# Patient Record
Sex: FEMALE | Race: WHITE | NOT HISPANIC OR LATINO | Employment: FULL TIME | URBAN - METROPOLITAN AREA
[De-identification: names, ages, dates, MRNs, and addresses within clinical notes are randomized per-mention and may not be internally consistent; named-entity substitution may affect disease eponyms.]

---

## 2018-06-20 DIAGNOSIS — Z01.812 PRE-PROCEDURAL LABORATORY EXAMINATION: ICD-10-CM

## 2018-06-20 LAB — HCG SERPL QL: NEGATIVE

## 2018-06-20 PROCEDURE — 84703 CHORIONIC GONADOTROPIN ASSAY: CPT

## 2018-06-20 PROCEDURE — 36415 COLL VENOUS BLD VENIPUNCTURE: CPT

## 2018-06-21 ENCOUNTER — HOSPITAL ENCOUNTER (OUTPATIENT)
Facility: MEDICAL CENTER | Age: 38
End: 2018-06-21
Attending: OPHTHALMOLOGY | Admitting: OPHTHALMOLOGY
Payer: COMMERCIAL

## 2018-06-21 VITALS
TEMPERATURE: 98.6 F | DIASTOLIC BLOOD PRESSURE: 67 MMHG | SYSTOLIC BLOOD PRESSURE: 109 MMHG | WEIGHT: 126.32 LBS | BODY MASS INDEX: 23.25 KG/M2 | RESPIRATION RATE: 16 BRPM | HEART RATE: 57 BPM | OXYGEN SATURATION: 98 % | HEIGHT: 62 IN

## 2018-06-21 PROCEDURE — 501425 HCHG SPONGE, WECKCEL SPEAR: Performed by: OPHTHALMOLOGY

## 2018-06-21 PROCEDURE — 160002 HCHG RECOVERY MINUTES (STAT): Performed by: OPHTHALMOLOGY

## 2018-06-21 PROCEDURE — 160035 HCHG PACU - 1ST 60 MINS PHASE I: Performed by: OPHTHALMOLOGY

## 2018-06-21 PROCEDURE — 160029 HCHG SURGERY MINUTES - 1ST 30 MINS LEVEL 4: Performed by: OPHTHALMOLOGY

## 2018-06-21 PROCEDURE — 502585 HCHG PACK, MUSCLE: Performed by: OPHTHALMOLOGY

## 2018-06-21 PROCEDURE — 160036 HCHG PACU - EA ADDL 30 MINS PHASE I: Performed by: OPHTHALMOLOGY

## 2018-06-21 PROCEDURE — 501836 HCHG SUTURE EYE: Performed by: OPHTHALMOLOGY

## 2018-06-21 PROCEDURE — 160009 HCHG ANES TIME/MIN: Performed by: OPHTHALMOLOGY

## 2018-06-21 PROCEDURE — 160041 HCHG SURGERY MINUTES - EA ADDL 1 MIN LEVEL 4: Performed by: OPHTHALMOLOGY

## 2018-06-21 PROCEDURE — 500447 HCHG DRESSING, TEGADERM 8X12: Performed by: OPHTHALMOLOGY

## 2018-06-21 PROCEDURE — 700101 HCHG RX REV CODE 250

## 2018-06-21 PROCEDURE — 160048 HCHG OR STATISTICAL LEVEL 1-5: Performed by: OPHTHALMOLOGY

## 2018-06-21 PROCEDURE — 700111 HCHG RX REV CODE 636 W/ 250 OVERRIDE (IP)

## 2018-06-21 PROCEDURE — 500291 HCHG CAUTERY, OPTHTHALMIC: Performed by: OPHTHALMOLOGY

## 2018-06-21 RX ORDER — PHENYLEPHRINE HYDROCHLORIDE 25 MG/ML
SOLUTION/ DROPS OPHTHALMIC
Status: COMPLETED
Start: 2018-06-21 | End: 2018-06-21

## 2018-06-21 RX ORDER — FLUTICASONE PROPIONATE 50 MCG
1 SPRAY, SUSPENSION (ML) NASAL DAILY
COMMUNITY

## 2018-06-21 RX ORDER — SODIUM CHLORIDE, SODIUM LACTATE, POTASSIUM CHLORIDE, CALCIUM CHLORIDE 600; 310; 30; 20 MG/100ML; MG/100ML; MG/100ML; MG/100ML
INJECTION, SOLUTION INTRAVENOUS CONTINUOUS
Status: DISCONTINUED | OUTPATIENT
Start: 2018-06-21 | End: 2018-06-21 | Stop reason: HOSPADM

## 2018-06-21 RX ORDER — BALANCED SALT SOLUTION 6.4; .75; .48; .3; 3.9; 1.7 MG/ML; MG/ML; MG/ML; MG/ML; MG/ML; MG/ML
SOLUTION OPHTHALMIC
Status: DISCONTINUED | OUTPATIENT
Start: 2018-06-21 | End: 2018-06-21 | Stop reason: HOSPADM

## 2018-06-21 RX ADMIN — PHENYLEPHRINE HYDROCHLORIDE 1 DROP: 2.5 SOLUTION/ DROPS OPHTHALMIC at 11:45

## 2018-06-21 RX ADMIN — SODIUM CHLORIDE, SODIUM LACTATE, POTASSIUM CHLORIDE, CALCIUM CHLORIDE: 600; 310; 30; 20 INJECTION, SOLUTION INTRAVENOUS at 11:45

## 2018-06-21 ASSESSMENT — PAIN SCALES - GENERAL
PAINLEVEL_OUTOF10: 5
PAINLEVEL_OUTOF10: 5
PAINLEVEL_OUTOF10: 0
PAINLEVEL_OUTOF10: 0

## 2018-06-21 NOTE — DISCHARGE INSTRUCTIONS
ACTIVITY: Rest and take it easy for the first 24 hours.  A responsible adult is recommended to remain with you during that time.  It is normal to feel sleepy.  We encourage you to not do anything that requires balance, judgment or coordination.    MILD FLU-LIKE SYMPTOMS ARE NORMAL. YOU MAY EXPERIENCE GENERALIZED MUSCLE ACHES, THROAT IRRITATION, HEADACHE AND/OR SOME NAUSEA.    FOR 24 HOURS DO NOT:  Drive, operate machinery or run household appliances.  Drink beer or alcoholic beverages.   Make important decisions or sign legal documents.    SPECIAL INSTRUCTIONS:     DIET: To avoid nausea, slowly advance diet as tolerated, avoiding spicy or greasy foods for the first day.  Add more substantial food to your diet according to your physician's instructions.  Babies can be fed formula or breast milk as soon as they are hungry.  INCREASE FLUIDS AND FIBER TO AVOID CONSTIPATION.    SURGICAL DRESSING/BATHING: OK to shower.     FOLLOW-UP APPOINTMENT:  A follow-up appointment should be arranged with your doctor; call to schedule.    You should CALL YOUR PHYSICIAN if you develop:  Fever greater than 101 degrees F.  Pain not relieved by medication, or persistent nausea or vomiting.  Excessive bleeding (blood soaking through dressing) or unexpected drainage from the wound.  Extreme redness or swelling around the incision site, drainage of pus or foul smelling drainage.  Inability to urinate or empty your bladder within 8 hours.  Problems with breathing or chest pain.    You should call 911 if you develop problems with breathing or chest pain.  If you are unable to contact your doctor or surgical center, you should go to the nearest emergency room or urgent care center.  Physician's telephone #: Dr. Thibodeaux [phone].    If any questions arise, call your doctor.  If your doctor is not available, please feel free to call the Surgical Center at [phone].  The Center is open Monday through Friday from 7AM to 7PM.  You can also  call the HEALTH HOTLINE open 24 hours/day, 7 days/week and speak to a nurse at [phone], or toll free at [phone].    A registered nurse may call you a few days after your surgery to see how you are doing after your procedure.    MEDICATIONS: Resume taking daily medication.  Take prescribed pain medication with food.  If no medication is prescribed, you may take non-aspirin pain medication if needed.  PAIN MEDICATION CAN BE VERY CONSTIPATING.  Take a stool softener or laxative such as senokot, pericolace, or milk of magnesia if needed.    Prescription given for Tobradex ointment given to patient, apply twice a day to affected eye. Tylenol #3 as needed for pain.  Last pain medication given at n/a.    If your physician has prescribed pain medication that includes Acetaminophen (Tylenol), do not take additional Acetaminophen (Tylenol) while taking the prescribed medication.    Depression / Suicide Risk    As you are discharged from this [facility] facility, it is important to learn how to keep safe from harming yourself.    Recognize the warning signs:  · Abrupt changes in personality, positive or negative- including increase in energy   · Giving away possessions  · Change in eating patterns- significant weight changes-  positive or negative  · Change in sleeping patterns- unable to sleep or sleeping all the time   · Unwillingness or inability to communicate  · Depression  · Unusual sadness, discouragement and loneliness  · Talk of wanting to die  · Neglect of personal appearance   · Rebelliousness- reckless behavior  · Withdrawal from people/activities they love  · Confusion- inability to concentrate     If you or a loved one observes any of these behaviors or has concerns about self-harm, here's what you can do:  · Talk about it- your feelings and reasons for harming yourself  · Remove any means that you might use to hurt yourself (examples: pills, rope, extension cords, firearm)  · Get professional  help from the community (Mental Health, Substance Abuse, psychological counseling)  · Do not be alone:Call your Safe Contact- someone whom you trust who will be there for you.  · Call your local CRISIS HOTLINE 426-7543 or 682-889-2176  · Call your local Children's Mobile Crisis Response Team [place] (684) 581-1941 or www.[employer]  · Call the toll free National Suicide Prevention Hotlines   · National Suicide Prevention Lifeline 552-747-CSZX (8560)  · National Hope Line Network 800-SUICIDE (894-0255)

## 2018-06-21 NOTE — PROGRESS NOTES
1509- Pt received from OR. Report from OR RN and Dr. Ovalle. VSS. Pt awake, eyes closed with ointment on right eye. Pt requesting water.  Lungs CTA.     1532-Pt resting in gurney, cold pack to right eye.     1620-Pt resting in gurney, c/o 5/10 right eye pain, declining pain medication.     1642-Pt waiting for ride.    1709-Pt discharged home with . Pt ambulatory with steady gait.

## 2018-07-02 NOTE — OP REPORT
DATE OF SERVICE:  06/21/2018    SURGEON:  Joann Thibodeaux MD    ASSISTANT:  None.    ANESTHESIA:  General.    ANESTHESIOLOGIST:  Demond Ovalle MD    COMPLICATIONS:  None.    PREOPERATIVE DIAGNOSIS:  Exotropia.    POSTOPERATIVE DIAGNOSIS:  Exotropia.    PROCEDURES PERFORMED:  1.  Right medial and lateral rectus exploration.  2.  Right medial rectus recession, 3.0 mm.  3.  Right lateral rectus recession, 4.0 mm.    DESCRIPTION OF PROCEDURE:  The risks, benefits, and alternatives to the   procedure were discussed with the patient and she elected to proceed.  The   patient was brought to the operating room in stable condition and inducted   under general anesthesia without complications.  Both eyes were prepped and   draped in the usual sterile ophthalmic fashion.  An inferonasal fornix   incision was created to enter conjunctiva and Tenon's capsule of the right   eye.  A Carter' hook followed by a Maben hook was used to hook the medial   rectus muscle.  It was noted that there was scar tissue about the medial   rectus muscle indicating previous eye muscle surgery.  The medial rectus   muscle appeared to be recessed roughly 4 mm posterior to the anatomic   insertion site of 5.5 mm posterior to the limbus.  A 5-0 Mersilene suture on   S14 spatula needle was used to imbricate the muscle belly 3 mm posterior to   the insertion site.  The intervening muscle was resected.  The cut   edge of the muscle was then placed at the preoperative insertion site.  An   inferotemporal fornix incision was created to enter conjunctiva and Tenon's   capsule of the right eye.  A Carter' hook followed by a Adolfo hook was used   to hook the lateral rectus muscle.  It was noted that there was scar tissue   about the lateral rectus insertion site indicating previous eye muscle   surgery.  The muscle appeared to be previously recessed roughly 4.0 mm   posterior to the anatomic insertion site of 7.9 mm posterior to the limbus.  A    6-0 Vicryl double-armed suture on S29 spatula needle was used to imbricate   the muscle belly at its insertion site using a locking bite at either edge of   the muscle.  The muscle was disinserted from the globe, then placed 4.0 mm   posterior to the insertion site.  6-0 plain gut sutures were used to   close Tenon's capsule and conjunctiva.  Maxitrol ointment was placed on the   eye.  The patient tolerated the procedure well.  There were no complications.       ____________________________________     HOOD BARRERA MD    [facility]    DD:  07/02/2018 11:38:33  DT:  07/02/2018 11:47:48    D#:  [id number]  Job#:  [id number]

## (undated) DEVICE — MASK AIRWAY SIZE 3 UNIQUE SILICON (10/BX)

## (undated) DEVICE — SUCTION INSTRUMENT YANKAUER BULBOUS TIP W/O VENT (50EA/CA)

## (undated) DEVICE — CANISTER SUCTION RIGID RED 1500CC (40EA/CA)

## (undated) DEVICE — GLOVE SZ 6 BIOGEL PI MICRO - PF LF (50PR/BX 4BX/CA)

## (undated) DEVICE — MASK ANESTHESIA ADULT  - (100/CA)

## (undated) DEVICE — SPEAR EYE SPNG 3ANG MLBL HNDL - (10/ST18ST/PK 180/PK 1PK/SP)

## (undated) DEVICE — CATHETER IV 20 GA X 1-1/4 ---SURG.& SDS ONLY--- (50EA/BX)

## (undated) DEVICE — SUTURE 6-0 PLAIN GUT G-1 D/A 18 (12PK/BX)"

## (undated) DEVICE — DRESSING TEGADERM 8 X 12 - (10/BX 8BX/CA)

## (undated) DEVICE — MASK ANESTHESIA CHILD/SMALL ADULT SIZE 4 (50/CA)

## (undated) DEVICE — GLOVE BIOGEL SZ 8.5 SURGICAL PF LTX - (50PR/BX 4BX/CA)

## (undated) DEVICE — SENSOR SPO2 NEO LNCS ADHESIVE (20/BX) SEE USER NOTES

## (undated) DEVICE — SET LEADWIRE 5 LEAD BEDSIDE DISPOSABLE ECG (1SET OF 5/EA)

## (undated) DEVICE — Device

## (undated) DEVICE — SODIUM CHL IRRIGATION 0.9% 1000ML (12EA/CA)

## (undated) DEVICE — DRAPE MAGNETIC (INSTRA-MAG) - (30/CA)

## (undated) DEVICE — WATER IRRIGATION STERILE 1000ML (12EA/CA)

## (undated) DEVICE — SUTURE EYE

## (undated) DEVICE — CANISTER SUCTION 3000ML MECHANICAL FILTER AUTO SHUTOFF MEDI-VAC NONSTERILE LF DISP  (40EA/CA)

## (undated) DEVICE — KIT ANESTHESIA W/CIRCUIT & 3/LT BAG W/FILTER (20EA/CA)

## (undated) DEVICE — CAUTERY OPTHALMIC WECK FINE TIP (10EA/SP)

## (undated) DEVICE — ELECTRODE 850 FOAM ADHESIVE - HYDROGEL RADIOTRNSPRNT (50/PK)

## (undated) DEVICE — NEPTUNE 4 PORT MANIFOLD - (20/PK)

## (undated) DEVICE — MICRODRIP PRIMARY VENTED 60 (48EA/CA) THIS WAS PART #2C8428 WHICH WAS DISCONTINUED

## (undated) DEVICE — LACTATED RINGERS INJ 1000 ML - (14EA/CA 60CA/PF)

## (undated) DEVICE — KIT  I.V. START (100EA/CA)